# Patient Record
Sex: MALE | Race: WHITE | HISPANIC OR LATINO | Employment: OTHER | URBAN - METROPOLITAN AREA
[De-identification: names, ages, dates, MRNs, and addresses within clinical notes are randomized per-mention and may not be internally consistent; named-entity substitution may affect disease eponyms.]

---

## 2021-12-16 ENCOUNTER — APPOINTMENT (OUTPATIENT)
Dept: RADIOLOGY | Facility: MEDICAL CENTER | Age: 41
End: 2021-12-16
Attending: EMERGENCY MEDICINE

## 2021-12-16 ENCOUNTER — HOSPITAL ENCOUNTER (EMERGENCY)
Facility: MEDICAL CENTER | Age: 41
End: 2021-12-16
Attending: EMERGENCY MEDICINE

## 2021-12-16 VITALS
WEIGHT: 176.59 LBS | RESPIRATION RATE: 18 BRPM | HEART RATE: 89 BPM | OXYGEN SATURATION: 99 % | BODY MASS INDEX: 28.38 KG/M2 | DIASTOLIC BLOOD PRESSURE: 84 MMHG | TEMPERATURE: 98.1 F | SYSTOLIC BLOOD PRESSURE: 134 MMHG | HEIGHT: 66 IN

## 2021-12-16 DIAGNOSIS — S40.021A ARM CONTUSION, RIGHT, INITIAL ENCOUNTER: ICD-10-CM

## 2021-12-16 PROCEDURE — 73030 X-RAY EXAM OF SHOULDER: CPT | Mod: RT

## 2021-12-16 PROCEDURE — 73060 X-RAY EXAM OF HUMERUS: CPT | Mod: RT

## 2021-12-16 PROCEDURE — 73200 CT UPPER EXTREMITY W/O DYE: CPT | Mod: LT

## 2021-12-16 PROCEDURE — 700111 HCHG RX REV CODE 636 W/ 250 OVERRIDE (IP): Performed by: EMERGENCY MEDICINE

## 2021-12-16 PROCEDURE — 96372 THER/PROPH/DIAG INJ SC/IM: CPT

## 2021-12-16 PROCEDURE — 99284 EMERGENCY DEPT VISIT MOD MDM: CPT

## 2021-12-16 RX ORDER — ONDANSETRON 4 MG/1
4 TABLET, ORALLY DISINTEGRATING ORAL ONCE
Status: COMPLETED | OUTPATIENT
Start: 2021-12-16 | End: 2021-12-16

## 2021-12-16 RX ORDER — MORPHINE SULFATE 4 MG/ML
4 INJECTION INTRAVENOUS ONCE
Status: COMPLETED | OUTPATIENT
Start: 2021-12-16 | End: 2021-12-16

## 2021-12-16 RX ADMIN — ONDANSETRON 4 MG: 4 TABLET, ORALLY DISINTEGRATING ORAL at 16:03

## 2021-12-16 RX ADMIN — MORPHINE SULFATE 4 MG: 4 INJECTION INTRAVENOUS at 16:03

## 2021-12-16 NOTE — ED TRIAGE NOTES
"Patient stated \"my wife and I went up to [employer] to get something to drink.\"    It was suggested to patient not to leave to er waiting room.   "

## 2021-12-16 NOTE — ED TRIAGE NOTES
2nd call for vital recheck no answer, patient is not outside, restroom, family room, or senior lounge.

## 2021-12-16 NOTE — ED NOTES
Pt stated that he fell onto his Rt shoulder. Pain during movement only, no pain with touch, no pain with passive range of motion.

## 2021-12-16 NOTE — Clinical Note
Brayan Armstrong was seen and treated in our emergency department on 12/16/2021.  He may return to work on 12/18/2021.  limited heavy lifting with the right arm for the next 2 to 3 days.     If you have any questions or concerns, please don't hesitate to call.      Ju Elkins M.D.

## 2021-12-16 NOTE — ED TRIAGE NOTES
Pt comes in complaining of R arm pain. Pt reporting he slipped and fell on his R arm/shoulder. Pt with limited ROM. +cms

## 2021-12-16 NOTE — ED PROVIDER NOTES
"ED Provider Note    Scribed for Ju Elkins M.D. by Del Paulino. 12/16/2021, 3:45 PM.    Primary care provider: No primary care provider noted.  Means of arrival: Walk-in  History obtained from: Patient  History limited by: None    CHIEF COMPLAINT  Chief Complaint   Patient presents with    T-5000 GLF    Shoulder Pain    Arm Pain       HPI  Brayan Armstrong is a 41 y.o. male who presents to the Emergency Department for acute GLF with right upper arm pain. Patient states around 6 hours ago he was at [employer] when he slipped and fell to the ground, hitting his right arm on the ground. He has significant pain with any movement of his shoulder. Patient denies any head injuries or pain to his forearm, chest wall, or neck.     REVIEW OF SYSTEMS  Pertinent positives include right shoulder pain. Pertinent negatives include no head injuries, forearm pain, chest wall pain, or neck pain. All other systems reviewed and negative.     PAST MEDICAL HISTORY  denies     SURGICAL HISTORY  patient denies any surgical history    SOCIAL HISTORY  Social History     Tobacco Use    Smoking status: Never Smoker    Smokeless tobacco: Not on file   Substance Use Topics    Alcohol use: Not Currently    Drug use: Yes     Comment: THC      Social History     Substance and Sexual Activity   Drug Use Yes    Comment: THC       FAMILY HISTORY  History reviewed. No pertinent family history.    CURRENT MEDICATIONS  Home Medications       Reviewed by Sonal Martinez R.N. (Registered Nurse) on 12/16/21 at 1145  Med List Status: Complete     Medication Last Dose Status        Patient Marcial Taking any Medications                           ALLERGIES  No Known Allergies    PHYSICAL EXAM  VITAL SIGNS: /81   Pulse 75   Temp 36.3 °C (97.3 °F) (Temporal)   Resp 14   Ht 1.676 m (5' 6\")   Wt 80.1 kg (176 lb 9.4 oz)   SpO2 98%   BMI 28.50 kg/m²     Constitutional:  Well developed, No acute distress, Non-toxic appearance.   HENT: Normocephalic, " Atraumatic, Bilateral external ears normal, Nose normal.   Eyes: PERRL, EOMI, Conjunctiva normal.    Neck: Normal range of motion, No tenderness, Supple.    Cardiovascular: Normal heart rate, Normal rhythm.    Thorax & Lungs: Normal breath sounds, No respiratory distress.    Skin: Warm, Dry, No erythema, No rash.   Back: No tenderness, No CVA tenderness.   Extremities: Intact distal pulses. Right anterior humerus with tenderness, no obvious deformity palpated, unable to range secondary to pain, 2+ radial pulse, good  strength, no clavicular tenderness.   Neurologic: Alert & oriented x 3, Normal motor function, Normal sensory function, No focal deficits noted.   Psychiatric: Appropriate                                                     DIAGNOSTIC STUDIES / PROCEDURES\    RADIOLOGY  CT-SHOULDER W/O PLUS RECONS RIGHT   Final Result      1.  No fracture or dislocation of RIGHT shoulder.   2.  Lucency seen on radiograph corresponds to vascular channel.      DX-HUMERUS 2+ RIGHT   Final Result      Negative RIGHT humerus series.      DX-SHOULDER 2+ RIGHT   Final Result      Findings suspicious for scapular fracture. Consider CT.        The radiologist's interpretation of all radiological studies have been reviewed by me.    COURSE & MEDICAL DECISION MAKING  Nursing notes, VS, PMSFHx reviewed in chart.   Patient presents to the emergency department with arm pain after a fall.  Patient did not hit his head or lose consciousness.  Patient is not having neck or back pain.  His main complaint is pain in the right humerus area.  There is no evidence of a shoulder deformity that suggest dislocation.  He does not have a clavicular tenderness.  He is neurovascularly intact.  Differential includes possible contusion versus fracture.    3:45 PM Patient seen and examined at bedside.  Ordered for DX-shoulder right and DX-humerus right to evaluate. Patient was treated with morphine 4 mg and Zofran 4 mg for his symptoms.    4:50 PM  X-ray showed findings suspicious for scapular fracture, CT ordered.  This is a bit unusual finding as he is not necessarily having pain in the scapular area its more in the humerus area.  However with his continued pain I do think CT to rule out fracture is indicated.    CT has returned and shows no evidence of fracture of the scapula.  X-ray of the humerus showed no fracture.  I suspect he likely has a contusion.  He is feeling better and stable for outpatient management.    6:12 PM Updated patient on results which were reassuring. He is feeling significantly improved at this time. Discussed discharge instructions and return precautions with the patient and they were cleared for discharge. Patient was given the opportunity to ask any further questions. Patient is comfortable with discharge at this time.      The patient will return for new or worsening symptoms and is stable at the time of discharge.    The patient is referred to a primary physician for blood pressure management, diabetic screening, and for all other preventative health concerns.    DISPOSITION:  Patient will be discharged home in stable condition.    FOLLOW UP:  Connor Martinez M.D.  [address] [facility] [id number] [phone]      If symptoms worsen, return to the er.      OUTPATIENT MEDICATIONS:  New Prescriptions    No medications on file        FINAL IMPRESSION  1. Arm contusion, right, initial encounter          IDel (Lucien), am scribing for, and in the presence of, Ju Elkins M.D..    Electronically signed by: Del Paulino (Lucien), 12/16/2021    IJu M.D. personally performed the services described in this documentation, as scribed by Del Paulino in my presence, and it is both accurate and complete.    The note accurately reflects work and decisions made by me.  Ju Elkins M.D.  12/16/2021  9:33 PM

## 2021-12-16 NOTE — ED TRIAGE NOTES
"Patient vital signs rechecked and documented per [facility]. Patient denies any new needs at this time.  Patient updated on wait times, thanked for patience. Pt informed to alert triage tech or triage RN with any needs and/or changes in condition; patient verbalized understanding. Patient stated \"everything is the same.\"   "

## 2021-12-17 NOTE — ED NOTES
Pt provided with discharge instructions. Pt had no further questions. Pt ambulated to [facility]

## 2021-12-17 NOTE — DISCHARGE INSTRUCTIONS
Ice your sore area.  Take Tylenol or ibuprofen for pain.  Luckily the x-rays do not show evidence of a broken bone.  You likely have a bad bone bruise.  Any swelling, redness, numbness or coolness in your hand or any other concerns return for recheck.  Hope you feel better soon.